# Patient Record
Sex: MALE | Race: WHITE | Employment: FULL TIME | URBAN - METROPOLITAN AREA
[De-identification: names, ages, dates, MRNs, and addresses within clinical notes are randomized per-mention and may not be internally consistent; named-entity substitution may affect disease eponyms.]

---

## 2017-01-04 ENCOUNTER — HOSPITAL ENCOUNTER (OUTPATIENT)
Dept: PHYSICAL THERAPY | Age: 27
Discharge: HOME | End: 2017-01-04
Payer: COMMERCIAL

## 2017-01-04 PROCEDURE — 97110 THERAPEUTIC EXERCISES: CPT

## 2017-01-04 NOTE — PROGRESS NOTES
Alonso Murrieta   (MJK:[id number]) Therapy Center at   [facility], [address] [address], [place] [address]  [phone]   [phone]           [facility], [place], [address] [facility]  Outpatient PHYSICAL THERAPY: Daily Note 1/4/2017  Fall Risk Score: 1 (? 5 = High Risk)    ICD-10: Treatment Diagnosis: Pain in right knee (M25.561), Pain in right leg (M79.604)    REFERRING PHYSICIAN: Emmett Stuart MD MD Orders: evaluate and treat  Return Physician Appointment: --  MEDICAL/REFERRING DIAGNOSIS: iliotibial band syndrome affecting right lower leg; other ganglion and cyst of synovium, tendon, and bursa  PRIOR LEVEL OF FUNCTION: independent  PRECAUTIONS/ALLERGIES: none  ASSESSMENT:  ?????? ? ? This section established at most recent assessment?????????? Alonso Murrieta has attended 9 physical therapy treatments for R knee/ITB pain and has reported a significant reduction in pain and increase in function. He has been able to run for short distances without knee pain. Manual therapy techniques including dry needling, hip and knee strengthening, and a running gait analysis have been used to address the issues. Most recently he has reported shin splint-like symptoms, potentially from adjustments to the running gait. This has been addressed and today reported no pain in shin or knee. We will continue therapy at a decreased frequency to increase running distance and tolerance. PROBLEM LIST (Impairments causing functional limitations):  1. Decreased ADL/Functional Activities  2. Increased Pain affecting function  3. Other decreased knowledge HEP  GOALS: (Goals have been discussed and agreed upon with patient.)  SHORT-TERM FUNCTIONAL GOALS: Time Frame: 4 weeks  1. Pt will demonstrate independence with > or = 2 exercises in HEP. met  2. Pt will report running for > or = 1 mile without pain. met  DISCHARGE GOALS: Time Frame: 8 weeks  1.  Pt will demonstrate independence with > or = 4 exercises in HEP. met  2. Pt will report running for > or = 4 miles without pain. ongoing  REHABILITATION POTENTIAL FOR STATED GOALS: Jahaira Aguilera OF CARE:  INTERVENTIONS PLANNED: (Benefits and precautions of physical therapy have been discussed with the patient.)  1. balance exercise  2. cold  3. electrical stimulation  4. gait training  5. home exercise program (HEP)  6. manual therapy  7. therapeutic exercise/strengthening  8. Education  9. Dry needling  TREATMENT PLAN EFFECTIVE DATES: 11/30/16 to 1/30/17  FREQUENCY/DURATION: Follow patient 1-2 time a week for 6 weeks to address above goals. Regarding Claudene Miu Lollman's therapy, I certify that the treatment plan above will be carried out by a therapist or under their direction. Thank you for this referral,  Any Arana, PT, DPT     Referring Physician Signature: King Carmen MD          Date                       SUBJECTIVE:  1/4/2017   Pt reports no pain, has done some light running and knee has not had any issues. Present Symptoms at eval: pain during running, walking and high level activities   History of Present Injury/Illness (Reason for Referral):   R knee pain since May, insidious onset. MRI revealed inflammation and cyst in the back of the knee. Dominant Side: left  Past Medical History: head sx 2015, tonsilectomy 1995  Current Medications: ibuprofen as needed   Date Last Reviewed: 1/4/2017  Social History/Home Situation: house     Work/Activity History: IT, works from home  OBJECTIVE:  [id number] 1/4/2017    Outcome Measure: Tool Used: Lower Extremity Functional Scale (LEFS)  Score:  Initial: 65/80 Most Recent: X/80 (Date: -- )   Interpretation of Score: 20 questions each scored on a 5 point scale with 0 representing \"extreme difficulty or unable to perform\" and 4 representing \"no difficulty\". The lower the score, the greater the functional disability. 80/80 represents no disability.   Minimal detectable change is 9 points. Score 80 79-63 62-48 47-32 31-16 15-1 0   Modifier CH CI CJ CK CL CM CN     Observation/Orthostatic Postural Assessment:      No antalgic gait during ambulation in assessment. No pain during overhead squat with full knee flexion.      Running analysis: [employer] software was used to capture sagittal distal, frontal posterior close up and frontal posterior distal video's  · Sagittal: R: prominent HS, 1 deg knee flex, .19 from COM, 13 deg hip exten                      L: HS, 7 deg knee flex, .17 from COM, 17 deg  · Frontal posterior distal (normal pelvic drop: 5-7 deg): R: 10 deg, normal VAISHNAVI          L: 7 deg, normal arm swing  · Frontal posterior close up  (normal:w/ shoes 10 deg, without 12-15 deg): R:10 deg      L: 12 deg    Palpation:      Increased tenderness to R ITB insertion and mid-point; mild tenderness in R glut min and med  ROM:     SFMA Top Tier (FN = Functional and Non-painful, FP = Functional and Painful, DP = Dysfunctional and Painful, DN = Dysfunctional and Non-painful)  Date: 10/24  Cervical flexion: FN  Cervical extension: FN   Cervical rotation: L FN, R FN  Upper Extremity Pattern 1 (extension, IR): L FN, R FN  Upper Extremity Pattern 2 (flexion, ER): L FN, R FN  Multi-segmental flexion: FN  Multi-segmental extension: FN  Multi-segmental rotation: L FN, R FN  Single-Leg Stance: L DN, R DN  Overhead Deep Squat: DN                        Decreased length R ITB during R hip adduction across body  R knee AROM WFL      Strength:    R knee strength WFL   R hip strength WFL to manual strength test, dynamic testing in walking and running at a later date           Special Tests: none  Neurological Screen:   Myotomes: intact                 Dermatomes: intact                 Reflexes: not tested                 Functional Mobility:       knee and leg pain limits running  Balance:    no functional deficits noted; decreased dynamic SLS on R  TREATMENT:    (In addition to Assessment/Re-Assessment sessions the following treatments were rendered)  Therapeutic Exercise: ( 30):  Exercises per grid below to improve mobility and strength. 12/7 12/19 1/4   Activity/Exercise      Recumbent bike 10 min level 4 10 min level 4 10 min lvl 4   ITB belt stretch   30 sec hold x 2   Foam roll      Calm Shells      SLS      ITB Stretch      Bridges SL 10x, 2 sets B SL 10 x, 2 sets B SL 10 x, 2 sets B   Bridge walk X 3     Deep squat      Lateral walking squat 4 laps x 40 ft 4 laps x 40 ft 4 laps x 40 ft   Lunge walk      Single leg squat Forwards and back, on ground   10 x 3 sets bilta Forwards and back, on ground   10 x 3 sets bilat Forwards and back, on ground   10 x 3 sets bilat   SLS with 3 way toe touch  On blue foam x 10 ea On blue foam x 10 ea   Toe curls/extension      R ankle ankle dorsiflexion/inversion      Eccentric lowering from plantarflexion to dorsiflexion      DF stretch      SL jumps      Accentuated running posture (hip and knee flexion, other leg on floor up on toes) 10 sec hold x 3 B 10 sec hold x 2 B 10 sec hold x 2 B   Hamstrings stretch Focusing on lateral hamstring 30 sec hold x 3       MANUAL THERAPY: (  minutes): Soft tissue mobilization was utilized and necessary because of the patient's restricted motion of soft tissue. ·     Therapeutic Modalities()       HEP: As above; handouts given to patient for all exercises. ______________________________________________________________________________________________________    Treatment Assessment:  Mr. Violet Montoya tolerated the session well, reported fatigue but no pain or other issues. Has no further appointments scheduled. No future appointments. Progression/Medical Necessity:   · Skilled intervention continues to be required due to decreased function. Compliance with Program/Exercises: Will assess as treatment progresses.    Reason for Continuation of Services/Other Comments:  · Patient continues to require skilled intervention due to decreased function. Recommendations/Intent for next treatment session: \"Treatment next visit will focus on advancements to more challenging activities\".     Total Treatment Duration    PT Patient Time In/Time Out  Time In: 1530  Time Out: 1600    Kendall Benedict, PT, DPT

## 2017-04-05 NOTE — PROGRESS NOTES
Raina De Jesus   (ZPX:[birth year]) Therapy Center at   [facility], [address], [address]  [phone]   [phone]           [facility], [place], [address] [facility]  Outpatient PHYSICAL THERAPY: Discharge 4/5/2017  Fall Risk Score: 1 (? 5 = High Risk)    ICD-10: Treatment Diagnosis: Pain in right knee (M25.561), Pain in right leg (M79.604)    REFERRING PHYSICIAN: Ilri Quintana MD MD Orders: evaluate and treat  Return Physician Appointment: --  MEDICAL/REFERRING DIAGNOSIS: iliotibial band syndrome affecting right lower leg; other ganglion and cyst of synovium, tendon, and bursa  PRIOR LEVEL OF FUNCTION: independent  PRECAUTIONS/ALLERGIES: none  ASSESSMENT:  ?????? ? ? This section established at most recent assessment?????????? Riana De Jesus attended 12 physical therapy treatments for R knee/ITB pain from 10-24-16 to 1-4-17 and reported a significant reduction in pain and increase in function. He returned to all desired activities. Plan of care is discharged. PLAN OF CARE:  Discharge.    Thank you for this referral,  Reji العراقي, PT, DPT

## 2017-12-07 ENCOUNTER — APPOINTMENT (RX ONLY)
Dept: URBAN - METROPOLITAN AREA CLINIC 349 | Facility: CLINIC | Age: 27
Setting detail: DERMATOLOGY
End: 2017-12-07

## 2017-12-07 DIAGNOSIS — D22 MELANOCYTIC NEVI: ICD-10-CM

## 2017-12-07 DIAGNOSIS — L70.0 ACNE VULGARIS: ICD-10-CM | Status: WORSENING

## 2017-12-07 DIAGNOSIS — Z80.8 FAMILY HISTORY OF MALIGNANT NEOPLASM OF OTHER ORGANS OR SYSTEMS: ICD-10-CM

## 2017-12-07 DIAGNOSIS — L82.1 OTHER SEBORRHEIC KERATOSIS: ICD-10-CM

## 2017-12-07 PROBLEM — L85.3 XEROSIS CUTIS: Status: ACTIVE | Noted: 2017-12-07

## 2017-12-07 PROBLEM — J30.1 ALLERGIC RHINITIS DUE TO POLLEN: Status: ACTIVE | Noted: 2017-12-07

## 2017-12-07 PROBLEM — E13.9 OTHER SPECIFIED DIABETES MELLITUS WITHOUT COMPLICATIONS: Status: ACTIVE | Noted: 2017-12-07

## 2017-12-07 PROBLEM — D22.72 MELANOCYTIC NEVI OF LEFT LOWER LIMB, INCLUDING HIP: Status: ACTIVE | Noted: 2017-12-07

## 2017-12-07 PROBLEM — D22.5 MELANOCYTIC NEVI OF TRUNK: Status: ACTIVE | Noted: 2017-12-07

## 2017-12-07 PROCEDURE — ? OTHER

## 2017-12-07 PROCEDURE — ? TREATMENT REGIMEN

## 2017-12-07 PROCEDURE — 99213 OFFICE O/P EST LOW 20 MIN: CPT

## 2017-12-07 PROCEDURE — ? COUNSELING

## 2017-12-07 PROCEDURE — ? PRESCRIPTION

## 2017-12-07 RX ORDER — BENZOYL PEROXIDE 95 MG/G
AEROSOL, FOAM TOPICAL
Qty: 1 | Refills: 3 | Status: ERX | COMMUNITY
Start: 2017-12-07

## 2017-12-07 RX ORDER — CLINDAMYCIN HYDROCHLORIDE 150 MG/1
CAPSULE ORAL
Qty: 60 | Refills: 3 | Status: ERX | COMMUNITY
Start: 2017-12-07

## 2017-12-07 RX ADMIN — BENZOYL PEROXIDE: 95 AEROSOL, FOAM TOPICAL at 00:00

## 2017-12-07 RX ADMIN — CLINDAMYCIN HYDROCHLORIDE: 150 CAPSULE ORAL at 00:00

## 2017-12-07 ASSESSMENT — LOCATION DETAILED DESCRIPTION DERM
LOCATION DETAILED: LEFT SUPERIOR UPPER BACK
LOCATION DETAILED: LEFT SUPERIOR MEDIAL UPPER BACK
LOCATION DETAILED: RIGHT SUPERIOR UPPER BACK
LOCATION DETAILED: LEFT DORSAL FOOT
LOCATION DETAILED: LEFT MEDIAL SUPERIOR CHEST
LOCATION DETAILED: LEFT SUPERIOR LATERAL BUCCAL CHEEK
LOCATION DETAILED: LEFT MEDIAL INFERIOR CHEST

## 2017-12-07 ASSESSMENT — LOCATION ZONE DERM
LOCATION ZONE: FACE
LOCATION ZONE: FEET
LOCATION ZONE: TRUNK

## 2017-12-07 ASSESSMENT — LOCATION SIMPLE DESCRIPTION DERM
LOCATION SIMPLE: LEFT UPPER BACK
LOCATION SIMPLE: LEFT FOOT
LOCATION SIMPLE: LEFT CHEEK
LOCATION SIMPLE: CHEST
LOCATION SIMPLE: RIGHT UPPER BACK

## 2017-12-07 NOTE — PROCEDURE: TREATMENT REGIMEN
Detail Level: Detailed
Initiate Treatment: Start clindamycin twice daily and Riax foam before shower.

## 2017-12-07 NOTE — PROCEDURE: OTHER
Note Text (......Xxx Chief Complaint.): This diagnosis correlates with the
Other (Free Text): Patient to contact office in one month iunless acne is better then will follow up in 3 months
Detail Level: Zone